# Patient Record
Sex: FEMALE | Race: WHITE | NOT HISPANIC OR LATINO | Employment: FULL TIME | URBAN - METROPOLITAN AREA
[De-identification: names, ages, dates, MRNs, and addresses within clinical notes are randomized per-mention and may not be internally consistent; named-entity substitution may affect disease eponyms.]

---

## 2023-06-16 ENCOUNTER — HOSPITAL ENCOUNTER (EMERGENCY)
Facility: CLINIC | Age: 33
Discharge: HOME | End: 2023-06-16
Attending: EMERGENCY MEDICINE | Admitting: EMERGENCY MEDICINE
Payer: COMMERCIAL

## 2023-06-16 VITALS
HEART RATE: 113 BPM | OXYGEN SATURATION: 96 % | DIASTOLIC BLOOD PRESSURE: 73 MMHG | TEMPERATURE: 98.4 F | SYSTOLIC BLOOD PRESSURE: 127 MMHG | RESPIRATION RATE: 20 BRPM

## 2023-06-16 DIAGNOSIS — L02.91 ENCOUNTER FOR DRAINAGE OF ABSCESS: ICD-10-CM

## 2023-06-16 DIAGNOSIS — M27.2 MANDIBULAR ABSCESS: ICD-10-CM

## 2023-06-16 PROCEDURE — 250N000011 HC RX IP 250 OP 636: Performed by: EMERGENCY MEDICINE

## 2023-06-16 PROCEDURE — 41008 DRAINAGE OF MOUTH LESION: CPT

## 2023-06-16 PROCEDURE — 96361 HYDRATE IV INFUSION ADD-ON: CPT | Performed by: EMERGENCY MEDICINE

## 2023-06-16 PROCEDURE — 99285 EMERGENCY DEPT VISIT HI MDM: CPT | Performed by: EMERGENCY MEDICINE

## 2023-06-16 PROCEDURE — 87077 CULTURE AEROBIC IDENTIFY: CPT | Performed by: EMERGENCY MEDICINE

## 2023-06-16 PROCEDURE — 250N000009 HC RX 250

## 2023-06-16 PROCEDURE — 99285 EMERGENCY DEPT VISIT HI MDM: CPT | Mod: 25 | Performed by: EMERGENCY MEDICINE

## 2023-06-16 PROCEDURE — 258N000003 HC RX IP 258 OP 636: Performed by: EMERGENCY MEDICINE

## 2023-06-16 PROCEDURE — 96375 TX/PRO/DX INJ NEW DRUG ADDON: CPT | Performed by: EMERGENCY MEDICINE

## 2023-06-16 PROCEDURE — 96376 TX/PRO/DX INJ SAME DRUG ADON: CPT | Mod: 59 | Performed by: EMERGENCY MEDICINE

## 2023-06-16 PROCEDURE — 96374 THER/PROPH/DIAG INJ IV PUSH: CPT | Performed by: EMERGENCY MEDICINE

## 2023-06-16 PROCEDURE — 87205 SMEAR GRAM STAIN: CPT | Performed by: EMERGENCY MEDICINE

## 2023-06-16 RX ORDER — HYDROMORPHONE HYDROCHLORIDE 1 MG/ML
0.5 INJECTION, SOLUTION INTRAMUSCULAR; INTRAVENOUS; SUBCUTANEOUS
Status: COMPLETED | OUTPATIENT
Start: 2023-06-16 | End: 2023-06-16

## 2023-06-16 RX ORDER — OXYCODONE HCL 5 MG/5 ML
5-10 SOLUTION, ORAL ORAL EVERY 6 HOURS PRN
Qty: 100 ML | Refills: 0 | Status: SHIPPED | OUTPATIENT
Start: 2023-06-16

## 2023-06-16 RX ORDER — FENTANYL CITRATE 50 UG/ML
50 INJECTION, SOLUTION INTRAMUSCULAR; INTRAVENOUS ONCE
Status: COMPLETED | OUTPATIENT
Start: 2023-06-16 | End: 2023-06-16

## 2023-06-16 RX ORDER — FENTANYL CITRATE 50 UG/ML
INJECTION, SOLUTION INTRAMUSCULAR; INTRAVENOUS
Status: DISCONTINUED
Start: 2023-06-16 | End: 2023-06-16 | Stop reason: HOSPADM

## 2023-06-16 RX ORDER — CHLORHEXIDINE GLUCONATE ORAL RINSE 1.2 MG/ML
15 SOLUTION DENTAL 2 TIMES DAILY
Qty: 473 ML | Refills: 0 | Status: SHIPPED | OUTPATIENT
Start: 2023-06-16

## 2023-06-16 RX ORDER — SODIUM CHLORIDE, SODIUM LACTATE, POTASSIUM CHLORIDE, CALCIUM CHLORIDE 600; 310; 30; 20 MG/100ML; MG/100ML; MG/100ML; MG/100ML
INJECTION, SOLUTION INTRAVENOUS CONTINUOUS
Status: DISCONTINUED | OUTPATIENT
Start: 2023-06-16 | End: 2023-06-16 | Stop reason: HOSPADM

## 2023-06-16 RX ORDER — LIDOCAINE HYDROCHLORIDE AND EPINEPHRINE 10; 10 MG/ML; UG/ML
INJECTION, SOLUTION INFILTRATION; PERINEURAL
Status: COMPLETED
Start: 2023-06-16 | End: 2023-06-16

## 2023-06-16 RX ORDER — ONDANSETRON 2 MG/ML
4 INJECTION INTRAMUSCULAR; INTRAVENOUS EVERY 30 MIN PRN
Status: DISCONTINUED | OUTPATIENT
Start: 2023-06-16 | End: 2023-06-16 | Stop reason: HOSPADM

## 2023-06-16 RX ADMIN — FENTANYL CITRATE 50 MCG: 50 INJECTION, SOLUTION INTRAMUSCULAR; INTRAVENOUS at 18:33

## 2023-06-16 RX ADMIN — ONDANSETRON 4 MG: 2 INJECTION INTRAMUSCULAR; INTRAVENOUS at 18:33

## 2023-06-16 RX ADMIN — FENTANYL CITRATE 50 MCG: 50 INJECTION, SOLUTION INTRAMUSCULAR; INTRAVENOUS at 18:25

## 2023-06-16 RX ADMIN — HYDROMORPHONE HYDROCHLORIDE 0.5 MG: 1 INJECTION, SOLUTION INTRAMUSCULAR; INTRAVENOUS; SUBCUTANEOUS at 17:48

## 2023-06-16 RX ADMIN — FENTANYL CITRATE 50 MCG: 50 INJECTION, SOLUTION INTRAMUSCULAR; INTRAVENOUS at 18:41

## 2023-06-16 RX ADMIN — LIDOCAINE HYDROCHLORIDE,EPINEPHRINE BITARTRATE 50 ML: 10; .01 INJECTION, SOLUTION INFILTRATION; PERINEURAL at 18:29

## 2023-06-16 RX ADMIN — MIDAZOLAM 2 MG: 1 INJECTION INTRAMUSCULAR; INTRAVENOUS at 18:25

## 2023-06-16 RX ADMIN — SODIUM CHLORIDE, POTASSIUM CHLORIDE, SODIUM LACTATE AND CALCIUM CHLORIDE: 600; 310; 30; 20 INJECTION, SOLUTION INTRAVENOUS at 17:48

## 2023-06-16 ASSESSMENT — ACTIVITIES OF DAILY LIVING (ADL)
ADLS_ACUITY_SCORE: 35
ADLS_ACUITY_SCORE: 35

## 2023-06-16 NOTE — ED NOTES
Bed: ED23  Expected date: 6/16/23  Expected time: 12:00 AM  Means of arrival:   Comments:  Michael Panda; 32 yo F w/ L-sided dental/facial abscess that OMFS plan to take to the OR; MR#[id number]

## 2023-06-16 NOTE — ED PROVIDER NOTES
ED Provider Note  [facility]      History     Chief Complaint   Patient presents with     Oral Swelling     HPI  Michael Panda is a 33 year old female who was referred from outside hospital for large abscess refractory to multiple courses of amoxicillin.  She had a dental infection that was ultimately treated with extraction of 2 mandibular teeth however the swelling is persisted she has been seen multiple times today she had a CT done shows a large rim-enhancing abscess along the inferior aspect of the right mandibular body measuring up to 2.6 cm.  With a fistula extending into the floor of the mouth.  She has trismus no airway issues.  Oral surgery is aware.  She is having significant pain.    Past Medical History  History reviewed. No pertinent past medical history.  No past surgical history on file.  amoxicillin-clavulanate (AUGMENTIN) 875-125 MG tablet  chlorhexidine (PERIDEX) 0.12 % solution  oxyCODONE (ROXICODONE) 5 MG/5ML solution      No Known Allergies  Family History  No family history on file.  Social History   Social History     Tobacco Use     Smoking status: Every Day   Substance Use Topics     Alcohol use: No     Drug use: No         A medically appropriate review of systems was performed with pertinent positives and negatives noted in the HPI, and all other systems negative.    Physical Exam   BP: (!) 140/75  Pulse: 82  Temp: 98.4  F (36.9  C)  Resp: 17  SpO2: 99 %  Physical Exam  Vitals and nursing note reviewed.   Constitutional:       General: She is in acute distress.   HENT:      Head:      Jaw: Trismus present.        Mouth/Throat:      Mouth: Mucous membranes are moist.      Comments: Trismus is present.  There is substantial swelling over the right mandible that is firm and extends to the floor of the mouth.  There is no obvious external or internal drainage.  There is no overlying cellulitis.  Musculoskeletal:      Cervical back: Neck supple.   Neurological:       Mental Status: She is alert.           ED Course, Procedures, & Data      Procedures            5:19 PM oral surgery paged    Medications   lactated ringers infusion ( Intravenous $New Bag 6/16/23 1748)   ondansetron (ZOFRAN) injection 4 mg (4 mg Intravenous $Given 6/16/23 1833)   lidocaine 2%-EPINEPHrine 1:100,000 injection 40 mg (has no administration in time range)   fentaNYL (PF) (SUBLIMAZE) 100 MCG/2ML injection (has no administration in time range)   HYDROmorphone (PF) (DILAUDID) injection 0.5 mg (0.5 mg Intravenous $Given 6/16/23 1748)   lidocaine 1% with EPINEPHrine 1:100,000 1 %-1:132502 injection (50 mLs  $Given 6/16/23 1829)   midazolam (VERSED) injection 2 mg (2 mg Intravenous $Given 6/16/23 1825)   fentaNYL (PF) (SUBLIMAZE) injection 50 mcg (50 mcg Intravenous $Given 6/16/23 1825)   fentaNYL (PF) (SUBLIMAZE) injection 50 mcg (50 mcg Intravenous $Given 6/16/23 1833)   fentaNYL (PF) (SUBLIMAZE) injection 50 mcg (50 mcg Intravenous $Given 6/16/23 1841)          No results found for any visits on 06/16/23.  Medications   lactated ringers infusion ( Intravenous $New Bag 6/16/23 1748)   ondansetron (ZOFRAN) injection 4 mg (4 mg Intravenous $Given 6/16/23 1833)   lidocaine 2%-EPINEPHrine 1:100,000 injection 40 mg (has no administration in time range)   fentaNYL (PF) (SUBLIMAZE) 100 MCG/2ML injection (has no administration in time range)   HYDROmorphone (PF) (DILAUDID) injection 0.5 mg (0.5 mg Intravenous $Given 6/16/23 1748)   lidocaine 1% with EPINEPHrine 1:100,000 1 %-1:815353 injection (50 mLs  $Given 6/16/23 1829)   midazolam (VERSED) injection 2 mg (2 mg Intravenous $Given 6/16/23 1825)   fentaNYL (PF) (SUBLIMAZE) injection 50 mcg (50 mcg Intravenous $Given 6/16/23 1825)   fentaNYL (PF) (SUBLIMAZE) injection 50 mcg (50 mcg Intravenous $Given 6/16/23 1833)   fentaNYL (PF) (SUBLIMAZE) injection 50 mcg (50 mcg Intravenous $Given 6/16/23 1841)     Labs Ordered and Resulted from Time of ED Arrival to Time of  ED Departure - No data to display  No orders to display          Critical care was not performed.     Medical Decision Making  The patient's presentation was of moderate complexity (an acute illness with systemic symptoms).    The patient's evaluation involved:  discussion of management or test interpretation with another health professional (Oral surgery)    The patient's management necessitated moderate risk (prescription drug management including medications given in the ED).      Assessment & Plan    Incision and drainage of moderately large right mandibular abscess by oral surgery.  She has drains in place.  She will be discharged home to follow-up with them on Tuesday of next week.  In the meantime Augmentin 875 mg twice daily for 10 days, Peridex mouth rinse, liquid oxycodone, Tylenol.  Apply heat not ice to the swollen area.  Soft diet and drink plenty of fluids.    I have reviewed the nursing notes. I have reviewed the findings, diagnosis, plan and need for follow up with the patient.    New Prescriptions    AMOXICILLIN-CLAVULANATE (AUGMENTIN) 875-125 MG TABLET    Take 1 tablet by mouth 2 times daily for 10 days    CHLORHEXIDINE (PERIDEX) 0.12 % SOLUTION    Swish and spit 15 mLs in mouth 2 times daily    OXYCODONE (ROXICODONE) 5 MG/5ML SOLUTION    Take 5-10 mLs (5-10 mg) by mouth every 6 hours as needed for severe pain       Final diagnoses:   Mandibular abscess   Encounter for drainage of abscess       Sreedhar Wall MD  [facility] EMERGENCY DEPARTMENT  6/16/2023     Sreedhar Wall MD  06/16/23 8576

## 2023-06-16 NOTE — ED TRIAGE NOTES
Transfer from [facility] for OMFS consult  Tooth extraction 4 days ago, now with abscess  No SOB  Able to handle secretions       Triage Assessment     Row Name 06/16/23 7409       Triage Assessment (Adult)    Airway WDL WDL       Respiratory WDL    Respiratory WDL WDL       Skin Circulation/Temperature WDL    Skin Circulation/Temperature WDL WDL       Cardiac WDL    Cardiac WDL WDL       Peripheral/Neurovascular WDL    Peripheral Neurovascular WDL WDL       Cognitive/Neuro/Behavioral WDL    Cognitive/Neuro/Behavioral WDL WDL

## 2023-06-17 NOTE — PROCEDURES
PROCEDURE: Bedside incision and drainage of a right submandibular, sublingual, and vestibular abscess      PERFORMED BY: Carlos Ferro DDS     Informed consent was obtained from the patient prior to beginning this procedure. All risks, benefits, alternatives and medical necessity were communicated. Time was given for questions to be asked regarding the procedure, all of which were answered.    A surgical time-out was completed by the entire surgical team verifying correct patient, procedure, site, positioning, and availability of necessary imaging and equipment. Prior to beginning the procedure, the patient was administered versed and fentanyl by the ED staff for procedure sedation. The patient was anesthetized with 10 cc of 1% lidocaine.  A pharyngeal screen was placed.  A #15 blade was used to make a full-thickness mucoperiosteal flap with a distobuccal release extending from tooth #32 to tooth #22. Purulence was expressed. A culture swab was collected and sent for Gram stain, aerobic and anaerobic culture.  A #9 periosteal elevator was used to bluntly dissect on the lateral and medial aspects of the mandible.  Extraction sites #30 and #31 were debrided with a curette.  Sharp edges were smoothed with a rongeur and bone rest.  The sites were profusely irrigated with copious amounts of normal saline.  2 half-inch Penrose drains were secured with 3-0 silk suture.  1 drain placed on the medial aspect of the mandible and the other placed on the lateral aspect.  A single 3-0 Chromic Gut suture was used to loosely reapproximate the flap.  The bleeding was easily controlled and resolved with direct pressure to the wound. The patient tolerated the procedure well.     Detail Level: Zone

## 2023-06-17 NOTE — CONSULTS
ORAL & MAXILLOFACIAL SURGERY   CONSULT  Michael Panda,  MRN: [id number],  : 1990        ASSESSMENT   33 year old female with a right s33-year-old female with a noncontributory PMH presents to [facility] as a transfer from an outside hospital for evaluation by OMFS with a right submandibular, sublingual and mandibular vestibule abscess.     PLAN  1.  Recommend bedside incision and drainage with procedural sedation (provided by the [facility] ED)- see detailed procedure note  2.  Recommend a dose of 3g Unasyn prior to discharge  3.  HOB > 30, warm compresses to right face (avoid ice)  4.  Recommend discharge on Augmentin 875/125 twice daily for 10 days, 0.12% chlorhexidine rinse after each meal, pain control per primary  5.  Patient will follow-up outpatient on Tuesday, 2023 for drain removal (address provided below)  6.  Patient is stable from OMFS standpoint for discharge    Please contact the OMFS resident on-call with questions or concerns.    Discussed with chief resident and staff.    Carlos Ferro DDS  Oral & Maxillofacial Surgery, OMFS Intern    ____________________________________________      HPI  33-year-old female with a nonsignificant past medical history was transferred from an outside hospital for concern of a odontogenic abscess and evaluation by OMFS.  Patient states that on  last week she began to have swelling along her right lower jaw.  Patient saw a dentist on Tuesday where they extracted 2 teeth.  Patient states that the swelling persisted and eventually got worse.  Patient began to endorse dysphagia, odynophagia, and dysphonia.  She denies any dyspnea or trouble tolerating her secretions.  She otherwise denies any constitutional symptoms    HISTORY  Past Medical History: History reviewed. No pertinent past medical history.  Past Surgical History: No past surgical history on file.  Medications:   No current facility-administered medications on file prior to encounter.  No  current outpatient medications on file prior to encounter.       lidocaine 2%-EPINEPHrine 1:100,000  40 mg Intradermal Once     Allergies: No Known Allergies  Social History:   Social History     Socioeconomic History     Marital status: Single     Spouse name: Not on file     Number of children: Not on file     Years of education: Not on file     Highest education level: Not on file   Occupational History     Not on file   Tobacco Use     Smoking status: Every Day     Smokeless tobacco: Not on file   Vaping Use     Vaping status: Not on file   Substance and Sexual Activity     Alcohol use: No     Drug use: No     Sexual activity: Not on file   Other Topics Concern     Not on file   Social History Narrative     Not on file     Social Determinants of Health     Financial Resource Strain: Not on file   Food Insecurity: Not on file   Transportation Needs: Not on file   Physical Activity: Not on file   Stress: Not on file   Social Connections: Not on file   Intimate Partner Violence: Not on file   Housing Stability: Not on file       REVIEW OF SYSTEMS  10 point ROS reviewed and negative aside from listed in HPI    PHYSICAL EXAM  Vital Signs:   Vitals:    06/16/23 1700 06/16/23 1815 06/16/23 1845 06/16/23 1928   BP: (!) 140/75 127/73     Pulse: 82 74 113    Resp: 17   20   Temp: 98.4  F (36.9  C)      TempSrc: Oral      SpO2: 99%  96% 96%       GEN: WD/WN female, NAD  HEENT: NC/AT, EOMI, PERRL,Moderate induration along the right submandibular area and submental area, inferior border the mandible on the right is not palpable, RAGHU 20 mm spontaneously can be manipulated to 30 mm under significant distress, extraction sites #30 and 31 with purulent drainage, there is right vestibular fullness along the lateral surface of the mandible, the right floor mouth is firm and distended, her uvula is midline  CV: RRR, no M/G/R, warm and well-perfused  PULM: CTAB, breathing comfortably on room air  GI: Soft, ND/NT  MSK: [place], no  peripheral extremity edema  NEURO: AAOx4, CN II-XII intact bilaterally  PSYCH: Appropriate mood and affect            REVIEW OF LABORATORY DATA  Most Recent 3 CBC's:No lab results found.   Most Recent 3 BMP's:No lab results found.  Most Recent 2 LFT's:No lab results found.  Most Recent INR's and Anticoagulation Dosing History:  Anticoagulation Dose History          View : No data to display.                    Most Recent 3 Troponin's:No lab results found.  Most Recent Cholesterol Panel:No lab results found.  Most Recent 6 Bacteria Isolates From Any Culture (See EPIC Reports for Culture Details):No lab results found.  Most Recent TSH, T4 and A1c Labs:No lab results found.    IMAGING RESULTS (Include outside hospital results)     Independently reviewed demonstrates a rim-enhancing fluid collection consistent with odontogenic abscess along the lateral surface of the mandibular body consistent with a vestibular abscess as well as along the medial surface of the mandible consistent with a sublingual and submandibular space abscess, recently the extracted tooth #30 and 31

## 2023-06-17 NOTE — DISCHARGE INSTRUCTIONS
Soft diet  See oral surgery Tuesday of next week  Pain medication as needed, (oxycodone)  Take Tylenol every 4-6 hours  Drink plenty of fluids prevent dehydration  Use mouth rinse as directed

## 2023-06-18 ENCOUNTER — TELEPHONE (OUTPATIENT)
Dept: EMERGENCY MEDICINE | Facility: CLINIC | Age: 33
End: 2023-06-18
Payer: COMMERCIAL

## 2023-06-18 LAB
BACTERIA ABSC ANAEROBE+AEROBE CULT: ABNORMAL
BACTERIA ABSC ANAEROBE+AEROBE CULT: ABNORMAL
GRAM STAIN RESULT: ABNORMAL

## 2023-06-18 NOTE — TELEPHONE ENCOUNTER
[facility] Emergency Department/Urgent Care Lab result notification:    Reason for call    Notify the patient/parent of lab results    Assess patient symptoms (if applicable)    Review ED providers recommendations/discharge instructions (if necessary)    Advise per [facility] ED lab result protocol    Lab result  Final Abscess Aerobic Bacterial Culture (specimen - Mouth) report on 6/18/23  [facility] Emergency Dept discharge antibiotic prescribed: Amoxicillin-Clavulanate (Augmentin) 875-125 mg PO tablet, 1 tablet by mouth 2 times daily for 10 days   #1. Bacteria, 2+ Streptococcus constellatus,  is [SUSCEPTIBLE] to antibiotic.  Incision and Drainage performed in the [place] ED: yes  Recommendations in treatment per [facility] ED lab result culture protocol  3:36p  Relayed results to patient, she notices improvement she can talk and swallow better. She will continue to take her antibiotic. Did provide the [facility]  number as she is awaiting information on where she is supposed to have her follow up. Review of chart just states be seen in outpatient 6/20/23 for drain removal no phone number or location named. Nor is there information on her AVS  Patient will call tonight or tomorrow.  Grisel Johnson, SANGEETHA  [facility] Result SANGEETHA  [facility] Emergency [facility] Lab Result RN  Ph# [phone]

## 2023-06-21 ENCOUNTER — HOSPITAL ENCOUNTER (EMERGENCY)
Facility: CLINIC | Age: 33
Discharge: HOME | End: 2023-06-21
Attending: EMERGENCY MEDICINE | Admitting: EMERGENCY MEDICINE
Payer: COMMERCIAL

## 2023-06-21 VITALS
OXYGEN SATURATION: 97 % | WEIGHT: 150 LBS | HEART RATE: 100 BPM | BODY MASS INDEX: 22.73 KG/M2 | HEIGHT: 68 IN | SYSTOLIC BLOOD PRESSURE: 114 MMHG | DIASTOLIC BLOOD PRESSURE: 80 MMHG | TEMPERATURE: 98.4 F

## 2023-06-21 DIAGNOSIS — K04.7 DENTAL ABSCESS: ICD-10-CM

## 2023-06-21 PROCEDURE — 99282 EMERGENCY DEPT VISIT SF MDM: CPT | Performed by: EMERGENCY MEDICINE

## 2023-06-21 ASSESSMENT — ACTIVITIES OF DAILY LIVING (ADL): ADLS_ACUITY_SCORE: 35

## 2023-06-21 NOTE — ED TRIAGE NOTES
Pt had oral surgery Friday at the .  Pt supposed to follow up yesterday but pt not able to get a hold of the oral surgery office.      Triage Assessment     Row Name 06/21/23 1136       Triage Assessment (Adult)    Airway WDL WDL       Respiratory WDL    Respiratory WDL WDL       Skin Circulation/Temperature WDL    Skin Circulation/Temperature WDL WDL       Cardiac WDL    Cardiac WDL WDL       Peripheral/Neurovascular WDL    Peripheral Neurovascular WDL WDL       Cognitive/Neuro/Behavioral WDL    Cognitive/Neuro/Behavioral WDL WDL

## 2023-06-21 NOTE — DISCHARGE INSTRUCTIONS
[facility] for an appointment at 9 AM.  Please arrive 15 minutes early.  The dentist will evaluate your wound and decide about pulling the drain at that time.

## 2023-06-21 NOTE — ED PROVIDER NOTES
"  History     Chief Complaint   Patient presents with     Dental Problem     HPI  Michael Panda is a 33 year old female who presents for evaluation of her jaw.  The patient had incision and drainage of an abscess in the right lower jaw 5 days ago at the [facility] by the oral maxillofacial surgeon.  She was discharged on amoxicillin-clavulanate and she says this has been helping.  She has been using acetaminophen and ibuprofen for symptoms with improvement.  No fevers.  Swelling is much agree improved.  No significant drainage from the wound.  She was supposed to have been in clinic yesterday for removal of the drain but unfortunately it did not work out.  She is here for a recheck.    I reviewed the emergency department visit note from 6/16/2023.  I reviewed the procedure note from performed by OMFS on 6/16/2023.    Allergies:  No Known Allergies    Problem List:    There are no problems to display for this patient.       Past Medical History:    No past medical history on file.    Past Surgical History:    No past surgical history on file.    Family History:    No family history on file.    Social History:  Marital Status:  Single [1]  Social History     Tobacco Use     Smoking status: Every Day   Substance Use Topics     Alcohol use: No     Drug use: No        Medications:    amoxicillin-clavulanate (AUGMENTIN) 875-125 MG tablet  chlorhexidine (PERIDEX) 0.12 % solution  oxyCODONE (ROXICODONE) 5 MG/5ML solution          Review of Systems    Physical Exam   BP: 114/80  Pulse: 100  Temp: 98.4  F (36.9  C)  Height: 172.7 cm (5' 8\")  Weight: 68 kg (150 lb)  SpO2: 97 %      Physical Exam  Constitutional:       General: She is not in acute distress.     Appearance: Normal appearance. She is well-developed.   HENT:      Head: Normocephalic and atraumatic.      Mouth/Throat:      Comments: Swelling along the right lower jawline, no erythema or excess warmth.  No significant tenderness.  She has " a Penrose drain along the mandible and a stitch is visible there.  Eyes:      General: No scleral icterus.     Conjunctiva/sclera: Conjunctivae normal.   Cardiovascular:      Rate and Rhythm: Normal rate.   Pulmonary:      Effort: Pulmonary effort is normal. No respiratory distress.   Musculoskeletal:      Cervical back: Normal range of motion and neck supple.   Skin:     General: Skin is warm and dry.      Findings: No rash.   Neurological:      Mental Status: She is alert and oriented to person, place, and time.         ED Course                 Procedures              Critical Care time:  none               No results found for this or any previous visit (from the past 24 hour(s)).    Medications - No data to display    Assessments & Plan (with Medical Decision Making)   33-year-old female who is 5 days post incision and drainage of her right jawline and presents hoping she can have the drain removed.  Surgical site is well-appearing, appears to be healing well.  She says it looks much better than before.  I did discuss the case with the on-call resident with [facility].  We discussed future management and my plan was to remove the drains here per the patient's request but [facility] asked for me specifically to leave the drains in place.  They wanted to see her tomorrow in clinic at 9 AM and have help schedule that appointment.  I relayed all this information with the patient and she is discharged to home with instructions to continue taking antibiotics, return if worse, otherwise follow-up in clinic tomorrow.  She is in agreement with this plan.    I have reviewed the nursing notes.    I have reviewed the findings, diagnosis, plan and need for follow up with the patient.           Medical Decision Making  The patient's presentation was of low complexity (an acute and uncomplicated illness or injury).    The patient's evaluation involved:  review of external note(s) from 2 sources (see separate area of note for  details)  discussion of management or test interpretation with another health professional (see separate area of note for details)    The patient's management necessitated moderate risk (a decision regarding minor procedure (foreign body removal) with risk factors of none).        New Prescriptions    No medications on file       Final diagnoses:   Dental abscess       6/21/2023   [facility] EMERGENCY DEPT     Renny Camejo MD  06/21/23 9864